# Patient Record
Sex: MALE | Race: ASIAN | URBAN - METROPOLITAN AREA
[De-identification: names, ages, dates, MRNs, and addresses within clinical notes are randomized per-mention and may not be internally consistent; named-entity substitution may affect disease eponyms.]

---

## 2022-10-13 ENCOUNTER — OFFICE VISIT (OUTPATIENT)
Dept: URBAN - METROPOLITAN AREA CLINIC 92 | Facility: CLINIC | Age: 32
End: 2022-10-13

## 2022-10-20 ENCOUNTER — CLAIMS CREATED FROM THE CLAIM WINDOW (OUTPATIENT)
Dept: URBAN - METROPOLITAN AREA CLINIC 92 | Facility: CLINIC | Age: 32
End: 2022-10-20
Payer: COMMERCIAL

## 2022-10-20 ENCOUNTER — DASHBOARD ENCOUNTERS (OUTPATIENT)
Age: 32
End: 2022-10-20

## 2022-10-20 ENCOUNTER — LAB OUTSIDE AN ENCOUNTER (OUTPATIENT)
Dept: URBAN - METROPOLITAN AREA CLINIC 92 | Facility: CLINIC | Age: 32
End: 2022-10-20

## 2022-10-20 ENCOUNTER — WEB ENCOUNTER (OUTPATIENT)
Dept: URBAN - METROPOLITAN AREA CLINIC 92 | Facility: CLINIC | Age: 32
End: 2022-10-20

## 2022-10-20 VITALS
HEIGHT: 70 IN | WEIGHT: 156 LBS | BODY MASS INDEX: 22.33 KG/M2 | HEART RATE: 62 BPM | TEMPERATURE: 97.1 F | DIASTOLIC BLOOD PRESSURE: 69 MMHG | SYSTOLIC BLOOD PRESSURE: 123 MMHG

## 2022-10-20 DIAGNOSIS — R19.4 CHANGE IN BOWEL HABITS: ICD-10-CM

## 2022-10-20 DIAGNOSIS — R10.32 LEFT LOWER QUADRANT PAIN: ICD-10-CM

## 2022-10-20 DIAGNOSIS — R10.31 RIGHT LOWER QUADRANT PAIN: ICD-10-CM

## 2022-10-20 PROCEDURE — 99244 OFF/OP CNSLTJ NEW/EST MOD 40: CPT | Performed by: PHYSICIAN ASSISTANT

## 2022-10-20 PROCEDURE — 99244 OFF/OP CNSLTJ NEW/EST MOD 40: CPT | Performed by: INTERNAL MEDICINE

## 2022-10-20 PROCEDURE — 99204 OFFICE O/P NEW MOD 45 MIN: CPT | Performed by: INTERNAL MEDICINE

## 2022-10-20 NOTE — HPI-TODAY'S VISIT:
The patient was referred by Dr. Lizz Baptiste for eval of abd pain.   A copy of this document is being forwarded to the referring provider. He notes some lower abd discomfort for about a year. The discomfort improves with passing gas, is worse with "cold air". Assoc change in bowel habits. Used to have formed BMs daily but now despite daily BMs has alternating constipation and loose stools with some incomplete emptying. No hematochezia or melena. No weight loss, N/V, GERD sx. No FH of IBD or colon CA. Notes over the last year "diet is not good" as not cooking and eating more fast food and not regular meals. Fiber intake low. Has been eating better over the last 2 weeks and symptoms improved some.  Reports a colonoscopy 3 years ago in [place] that was normal--done for similar symptoms that were present then, resolved and returned.  Was seen at [facility] and had labs: CMP WNL aside from bili 1.4

## 2022-10-21 LAB
C-REACTIVE PROTEIN, QUANT: 0.4
HEMATOCRIT: 48
HEMOGLOBIN: 16.3
IMMUNOGLOBULIN A: 202
INTERPRETATION: (no result)
MCH: 30.1
MCHC: 34
MCV: 88.6
MPV: 10.3
PLATELET COUNT: 272
RDW: 12.4
RED BLOOD CELL COUNT: 5.42
TISSUE TRANSGLUTAMINASE AB, IGA: <1
WHITE BLOOD CELL COUNT: 5.2

## 2022-10-28 LAB — CALPROTECTIN, FECAL: <5

## 2022-12-08 ENCOUNTER — OFFICE VISIT (OUTPATIENT)
Dept: URBAN - METROPOLITAN AREA CLINIC 92 | Facility: CLINIC | Age: 32
End: 2022-12-08